# Patient Record
Sex: MALE | Race: WHITE | NOT HISPANIC OR LATINO
[De-identification: names, ages, dates, MRNs, and addresses within clinical notes are randomized per-mention and may not be internally consistent; named-entity substitution may affect disease eponyms.]

---

## 2020-10-14 ENCOUNTER — APPOINTMENT (OUTPATIENT)
Dept: DERMATOLOGY | Facility: CLINIC | Age: 35
End: 2020-10-14

## 2020-11-11 ENCOUNTER — APPOINTMENT (OUTPATIENT)
Dept: DERMATOLOGY | Facility: CLINIC | Age: 35
End: 2020-11-11
Payer: COMMERCIAL

## 2020-11-11 DIAGNOSIS — D22.39 MELANOCYTIC NEVI OF OTHER PARTS OF FACE: ICD-10-CM

## 2020-11-11 DIAGNOSIS — L81.4 OTHER MELANIN HYPERPIGMENTATION: ICD-10-CM

## 2020-11-11 DIAGNOSIS — D22.5 MELANOCYTIC NEVI OF TRUNK: ICD-10-CM

## 2020-11-11 PROBLEM — Z00.00 ENCOUNTER FOR PREVENTIVE HEALTH EXAMINATION: Status: ACTIVE | Noted: 2020-11-11

## 2020-11-11 PROCEDURE — 99072 ADDL SUPL MATRL&STAF TM PHE: CPT

## 2020-11-11 PROCEDURE — 99203 OFFICE O/P NEW LOW 30 MIN: CPT

## 2020-11-11 NOTE — PHYSICAL EXAM
[Alert] : alert [Oriented x 3] : ~L oriented x 3 [Well Nourished] : well nourished [FreeTextEntry3] : The following areas were examined and no significant abnormalities were seen except as noted below:\par \par Type II skin\par \par scalp, face, eyelids, nose, lips, ears, neck, chest, abdomen, back,groin, buttocks, right arm, left arm, right hand, left hand,\par right  leg, left leg, right foot, left foot\par \par Right lateral cheek: 9 x 5 mm tan smooth patch-not suspicious in dermoscopy\par Nose: Mild 2 mm skin-colored papules, especially near the left corner of the nose\par Trunk: Mild to moderate brown macules and papules\par \par No suspicious lesion seen

## 2020-11-11 NOTE — HISTORY OF PRESENT ILLNESS
[FreeTextEntry1] : Evaluation of growths [de-identified] : First visit for 34 year old white male presenting for evaluation of growths.  Particularly concerned about lesions on the back. No history of skin cancer.

## 2020-11-30 ENCOUNTER — APPOINTMENT (OUTPATIENT)
Dept: DERMATOLOGY | Facility: CLINIC | Age: 35
End: 2020-11-30
Payer: COMMERCIAL

## 2020-11-30 VITALS — BODY MASS INDEX: 21.76 KG/M2 | WEIGHT: 175 LBS | HEIGHT: 75 IN

## 2020-11-30 DIAGNOSIS — D18.01 HEMANGIOMA OF SKIN AND SUBCUTANEOUS TISSUE: ICD-10-CM

## 2020-11-30 DIAGNOSIS — L70.0 ACNE VULGARIS: ICD-10-CM

## 2020-11-30 DIAGNOSIS — L81.4 OTHER MELANIN HYPERPIGMENTATION: ICD-10-CM

## 2020-11-30 DIAGNOSIS — D22.9 MELANOCYTIC NEVI, UNSPECIFIED: ICD-10-CM

## 2020-11-30 DIAGNOSIS — I78.1 NEVUS, NON-NEOPLASTIC: ICD-10-CM

## 2020-11-30 PROCEDURE — 99213 OFFICE O/P EST LOW 20 MIN: CPT

## 2020-11-30 RX ORDER — CLINDAMYCIN AND BENZOYL PEROXIDE 50; 10 MG/G; MG/G
1-5 GEL TOPICAL
Qty: 1 | Refills: 2 | Status: ACTIVE | COMMUNITY
Start: 2020-11-30 | End: 1900-01-01

## 2020-11-30 NOTE — HISTORY OF PRESENT ILLNESS
[FreeTextEntry1] : Patient presents for skin examination. [de-identified] : Notes breakouts on the face, and red spots of the face.  Denies bleeding or irritated lesions.  No self tx. for facial breakouts.

## 2020-11-30 NOTE — ASSESSMENT
[FreeTextEntry1] : A complete skin examination was performed.  There is no evidence of skin cancer.  We discussed the importance of photoprotection, including the use of hats, protective clothing and sunscreens with an SPF of at least 30.  Sun avoidance was also discussed.  The ABCDE's of melanoma was discussed.  Regular skin exams recommended.\par \par Acne\par tretinoin 0.025% cream qhs\par benzaclin gel qd\par Glyderm mask weekly.\par \par Telangiectasias - face.\par Education.\par PDL - risks/benefits discussed.\par Cosmetic at [facility]/tx for 1 or more txs as desired.\par \par Discussed other skin concerns such as elasticity of the skin.  \par \par

## 2020-11-30 NOTE — PHYSICAL EXAM
[Alert] : alert [Oriented x 3] : ~L oriented x 3 [Well Nourished] : well nourished [Full Body Skin Exam Performed] : performed [FreeTextEntry3] : A full skin exam was performed including the scalp, face (including lips, ears, nose and eyes), neck, chest, abdomen, back, buttocks, upper extremities and lower extremities.  The patient declined examination of the genitalia.  \par The exam revealed the following benign growths:\par Nicollet pigmented nevi - mostly on the back.  homogeneous and regular.\par Lentigines - extensive on the upper back and shoulders.\par Cherry angioma - abdomen.\par Telangiectasias and erythema - bilateral malar region.\par \par Some acne scarring of the bilateral malar region.

## 2020-12-04 RX ORDER — TRETINOIN 0.25 MG/G
0.03 CREAM TOPICAL
Qty: 1 | Refills: 2 | Status: ACTIVE | COMMUNITY
Start: 2020-11-30

## 2021-01-05 ENCOUNTER — APPOINTMENT (OUTPATIENT)
Dept: DERMATOLOGY | Facility: CLINIC | Age: 36
End: 2021-01-05
Payer: SELF-PAY

## 2021-01-05 DIAGNOSIS — Z41.1 ENCOUNTER FOR COSMETIC SURGERY: ICD-10-CM

## 2021-01-05 PROCEDURE — D0097: CPT

## 2022-07-27 ENCOUNTER — APPOINTMENT (OUTPATIENT)
Dept: ORTHOPEDIC SURGERY | Facility: CLINIC | Age: 37
End: 2022-07-27

## 2022-07-27 PROCEDURE — 99204 OFFICE O/P NEW MOD 45 MIN: CPT

## 2022-07-27 PROCEDURE — 97760 ORTHOTIC MGMT&TRAING 1ST ENC: CPT

## 2022-07-27 RX ORDER — HYDROCODONE BITARTRATE AND ACETAMINOPHEN 5; 325 MG/1; MG/1
5-325 TABLET ORAL
Qty: 40 | Refills: 0 | Status: ACTIVE | COMMUNITY
Start: 2022-07-27 | End: 1900-01-01

## 2022-07-27 RX ORDER — ASPIRIN 325 MG/1
325 TABLET, FILM COATED ORAL DAILY
Qty: 30 | Refills: 1 | Status: ACTIVE | COMMUNITY
Start: 2022-07-27 | End: 1900-01-01

## 2022-07-27 NOTE — DISCUSSION/SUMMARY
[de-identified] : Today I had a lengthy discussion with the patient regarding their right Achilles rupture. I have addressed all the patient's concerns surrounding the pathology of their condition. XR imaging and US results were reviewed with the patient. We discussed both operative and non-operative treatment options. At this time I would like to obtain advanced imaging of the patient's right ankle. An MRI was ordered so I can find out more about the etiology of the patient's condition. The patient should follow up with the office after obtaining the MRI.\par \par In the interim, I recommended that the patient utilize a CAM boot with lifts. The patient was fitted for the CAM boot and lifts in the office today. The patient was educated about the boot wear pattern and utilization, as well as the timeframe to come out of the boot. He was also given full instructions for using the boot. He should continue to utilize the crutches for ambulation. He should remain nonweightbearing on the RLE. I advised the patient to utilize 325 mg of Aspirin as instructed for blood thinning purposes. The prescription for the Aspirin was provided for the patient in the office today. Pain medications were also prescribed. The patient understood and verbally agreed to the treatment plan. All of their questions were answered and they were satisfied with the visit. The patient should call the office if they have any questions or experience worsening symptoms.

## 2022-07-27 NOTE — HISTORY OF PRESENT ILLNESS
[FreeTextEntry1] : The patient is a 36 year old male presenting for an initial evaluation of a right ankle injury sustained on 7/19/2022. The patient reports an onset of pain and a pop to his right Achilles while jogging in a field. He states that he has obtained x-rays and US at  which showed concern in regards to a possible Achilles rupture. He presents today for further evaluation of the same. He does report pain and swelling localized to his Achilles, worsened with walking, lifting, and weight bearing. The patient presents ambulating with crutches. 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 Pain scale 7 out of 10. He has no numbness or tingling in the ankle or foot. He denies calf pain. Previous lateral wound. He denies any utilization of antibiotics due to the wound. He is taking oral antiinflammatories for pain. No other complaints.\par

## 2022-07-27 NOTE — PHYSICAL EXAM
[de-identified] : General: Alert and oriented x3. In no acute distress. Pleasant in nature with a normal affect. No apparent respiratory distress.\par \par Right Ankle Exam\par Skin: Clean, dry, intact\par Inspection: No obvious malalignment, + swelling, no effusion; no lymphadenopathy. Middle third defect at the Achilles with a positive Hines's Test. \par Pulses: 2+ DP/PT pulses\par ROM: 10 degrees of dorsiflexion, 40 degrees of plantarflexion, 10 degrees of subtalar motion\par Tenderness: Pain at the Achilles. No tenderness over the lateral malleolus, no CFL/ATFL/PTFL pain. No medial malleolus pain, no deltoid ligament pain. No proximal fibular pain. No heel pain.\par Stability: Negative anterior/posterior drawer.\par Strength: 5/5 TA/GS/EHL\par Neuro: In tact to light touch throughout\par Additional tests: Negative Mortons test, Negative syndesmosis squeeze test. [de-identified] : Office Location: [address] [place] [address], [address]\par Office Phone: [phone]\par Office Fax: [phone]\par PATIENT NAME: Marco Fraser\par PATIENT PHONE NUMBER:\par PATIENT ID: [id number]\par : 1985\par DATE OF EXAM: 2022\par R. Phys. Name: Harmeet Thompson\par R. Phys. Address: [address] [place] [address] [place], [facility]\par R. Phys. Phone: [phone]\par History: Possible Achilles tendon tear\par \par Technique: Right Achilles tendon imaged using a linear 12 MHz transducer\par \par Comparison: No prior studies are available for direct comparison at the time of\par this interpretation.\par \par Findings: There is severe tendinosis of the Achilles tendon with a high-grade\par partial versus complete tear of its proximal aspect. Torn fibers are retracted\par displaced by approximately 2 mm. Associated hyperemia is present.\par \par There is mild adjacent soft tissue edema.\par \par IMPRESSION:\par \par \par High-grade partial versus complete tear of the proximal aspect of the Achilles\par tendon with retraction of the torn fibers by approximately 2 mm.\par \par Clinical correlation is recommended and if the patient's symptoms persist, then\par further evaluation with noncontrast MRI (if there are no contraindications) may\par provide additional diagnostic information.\par \par Signed by: Wilfrid Wilson MD\par Signed Date: 2022 2:38 PM EDT\par \par SIGNED BY: Wilfrid Wilson M.D., Ext. [phone] 02:38 PM\par \par Xrays of right ankle obtained from  Radiology on 2022 and reviewed in the office today, 2022 , revealed: Thickening of the Achilles tendon with underlying tendinopathy and partial tear. \par

## 2022-07-27 NOTE — ADDENDUM
[FreeTextEntry1] : I, Estela Nation, acted solely as a scribe for Dr. Ten Ko on this date 07/27/2022.\par \par All medical record entries made by the Scribe were at my, Dr. Ten Ko, direction and personally dictated by me on 07/27/2022. I have reviewed the chart and agree that the record accurately reflects my personal performance of the history, physical exam, assessment and plan. I have also personally directed, reviewed, and agreed with the chart.	\par

## 2022-07-28 ENCOUNTER — APPOINTMENT (OUTPATIENT)
Dept: MRI IMAGING | Facility: CLINIC | Age: 37
End: 2022-07-28

## 2022-07-28 ENCOUNTER — OUTPATIENT (OUTPATIENT)
Dept: OUTPATIENT SERVICES | Facility: HOSPITAL | Age: 37
LOS: 1 days | End: 2022-07-28
Payer: SELF-PAY

## 2022-07-28 DIAGNOSIS — S86.011A STRAIN OF RIGHT ACHILLES TENDON, INITIAL ENCOUNTER: ICD-10-CM

## 2022-07-28 PROCEDURE — 73721 MRI JNT OF LWR EXTRE W/O DYE: CPT

## 2022-07-28 PROCEDURE — 73721 MRI JNT OF LWR EXTRE W/O DYE: CPT | Mod: 26,RT

## 2022-08-01 ENCOUNTER — APPOINTMENT (OUTPATIENT)
Dept: ORTHOPEDIC SURGERY | Facility: CLINIC | Age: 37
End: 2022-08-01

## 2022-08-01 VITALS
BODY MASS INDEX: 23.13 KG/M2 | HEIGHT: 75 IN | SYSTOLIC BLOOD PRESSURE: 117 MMHG | DIASTOLIC BLOOD PRESSURE: 71 MMHG | WEIGHT: 186 LBS | HEART RATE: 60 BPM

## 2022-08-01 PROCEDURE — 99214 OFFICE O/P EST MOD 30 MIN: CPT

## 2022-08-01 NOTE — HISTORY OF PRESENT ILLNESS
[FreeTextEntry1] : 8/1/22: The patient is here to review the MRI of his right ankle.  Patient using boot and crutches.  Patient in DVT prophylaxis with aspirin.  Pain controlled.  No other complaints. \par \par 7/27/22: The patient is a 36 year old male presenting for an initial evaluation of a right ankle injury sustained on 7/19/2022. The patient reports an onset of pain and a pop to his right Achilles while jogging in a field. He states that he has obtained x-rays and US at  which showed concern in regards to a possible Achilles rupture. He presents today for further evaluation of the same. He does report pain and swelling localized to his Achilles, worsened with walking, lifting, and weight bearing. The patient presents ambulating with crutches. 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 Pain scale 7 out of 10. He has no numbness or tingling in the ankle or foot. He denies calf pain. Previous lateral wound. He denies any utilization of antibiotics due to the wound. He is taking oral antiinflammatories for pain. No other complaints.\par

## 2022-08-01 NOTE — PHYSICAL EXAM
[de-identified] : General: Alert and oriented x3. In no acute distress. Pleasant in nature with a normal affect. No apparent respiratory distress.\par \par Right Ankle Exam\par Skin: Clean, dry, intact\par Inspection: No obvious malalignment, + swelling, no effusion; no lymphadenopathy. Middle third defect at the Achilles with a positive Hines's Test. \par Pulses: 2+ DP/PT pulses\par ROM: 10 degrees of dorsiflexion, 40 degrees of plantarflexion, 10 degrees of subtalar motion\par Tenderness: Pain at the Achilles. No tenderness over the lateral malleolus, no CFL/ATFL/PTFL pain. No medial malleolus pain, no deltoid ligament pain. No proximal fibular pain. No heel pain.\par Stability: Negative anterior/posterior drawer.\par Strength: 5/5 TA/GS/EHL\par Neuro: In tact to light touch throughout\par Additional tests: Negative Mortons test, Negative syndesmosis squeeze test. [de-identified] : EXAM: [id number] - MR ANKLE RT - ORDERED BY: SHANICE CARMICHAEL\par \par \par PROCEDURE DATE: 07/28/2022\par \par \par \par INTERPRETATION: EXAMINATION: MRI of the right ankle without contrast\par \par CLINICAL INFORMATION: Right ankle pain. Evaluate for Achilles tendon tear.\par \par TECHNIQUE: Multiplanar, multisequential MR imaging was performed.\par \par FINDINGS: There is a full-thickness tear of the Achilles tendon. The tear occurs approximately 6.8 cm proximal to its insertion on the calcaneus. There is a 0.9 cm fluid-filled tear gap between the proximal and distal stumps the tendon. The proximal and distal stumps of the tendon or thickened and degenerated.\par \par There is no fracture or osteonecrosis. There are no joint effusions. Articular cartilage at the imaged joints is preserved.\par \par The medial and lateral ankle ligaments are intact. The syndesmotic ligaments are intact.\par \par There are no acute tendon or muscle tears. There is no muscle atrophy. There is diffuse subcutaneous soft tissue edema.\par \par IMPRESSION: Full-thickness tear of the Achilles tendon, as described above.\par \par --- End of Report ---\par \par \par \par \par \par \par CYRIL GILMORE MD; Attending Radiologist\par This document has been electronically signed. Jul 28 2022 3:22PM\par \par

## 2022-08-01 NOTE — DISCUSSION/SUMMARY
[de-identified] : At this time I reviewed the MRI of the right ankle in great details with the patient and answered all his questions about the right Achilles tendon rupture.  We went over surgical versus nonsurgical discussion about the Achilles tendon rupture.  At this time the patient would like to go ahead with the surgical procedure.  The consent will read as follows, " right Achilles tendon repair."  Prior to the surgery the patient will need medical clearance which includes lab work and a COVID-19 PCR test.  The patient will speak with my surgical coordinator to get on the surgical schedule in the near future.  In the interim the patient will continue with the cam walking boot, heel lifts and crutches.  He will continue with aspirin daily for DVT prophylaxis.  He will continue with ice and elevation for swelling control.  He could use over-the-counter anti-inflammatories and Tylenol for pain.\par \par All risks, benefits and alternatives to the recommended surgical procedure were discussed which include but are not limited to bleeding, infection, nerve damage, vascular damage, failure of the wound to heal, the need for further surgery, loss of limb, DVT, PE, loss of life as well as the risks associated with general anesthesia. The patient verbalized understanding and provided informed consent to move forward with surgery.

## 2022-08-04 DIAGNOSIS — Z01.818 ENCOUNTER FOR OTHER PREPROCEDURAL EXAMINATION: ICD-10-CM

## 2022-08-08 LAB — SARS-COV-2 N GENE NPH QL NAA+PROBE: NOT DETECTED

## 2022-08-08 NOTE — ASU PATIENT PROFILE, ADULT - NSICDXPASTMEDICALHX_GEN_ALL_CORE_FT
PAST MEDICAL HISTORY:  Achilles rupture     Cherry angioma     Fibrous papule of nose     H/O acne vulgaris     Lentigines     Multiple benign melanocytic nevi     Telangiectasia

## 2022-08-08 NOTE — ASU PATIENT PROFILE, ADULT - FALL HARM RISK - UNIVERSAL INTERVENTIONS
Bed in lowest position, wheels locked, appropriate side rails in place/Call bell, personal items and telephone in reach/Instruct patient to call for assistance before getting out of bed or chair/Non-slip footwear when patient is out of bed/[place] to call system/Physically safe environment - no spills, clutter or unnecessary equipment/Purposeful Proactive Rounding/Room/bathroom lighting operational, light cord in reach

## 2022-08-08 NOTE — ASU PATIENT PROFILE, ADULT - BILL OF RIGHTS/ADMISSION INFORMATION PROVIDED TO:
[FreeTextEntry1] : Pulmonary function test is performed. Lung volumes reveal a mild decrease in the total lung capacity and vital capacity. The residual volume and FRC are moderately reduced. Lung mechanics are within normal limits. Slight bronchodilator activity is demonstrated. The DLCO and saturation maintained. This represents a mild to moderate degree of restriction. Slight airway reactivity is demonstrated.
Patient

## 2022-08-08 NOTE — ASU PATIENT PROFILE, ADULT - HAVE YOU HAD A SECOND COVID-19 BOOSTER?
ROS: Constitutional- -fever, -chills.  Respiratory- -cough, -SOB  Cardiac- no chest pain, no palpitations, ENT- -rhinorrhea, no sore throat, no congestion.  Abdomen- No nausea, no vomiting, no diarrhea.  Urinary- no dysuria, no urgency, no frequency.  Skin- No rashes
Yes

## 2022-08-09 ENCOUNTER — APPOINTMENT (OUTPATIENT)
Dept: ORTHOPEDIC SURGERY | Facility: HOSPITAL | Age: 37
End: 2022-08-09

## 2022-08-09 ENCOUNTER — TRANSCRIPTION ENCOUNTER (OUTPATIENT)
Age: 37
End: 2022-08-09

## 2022-08-09 ENCOUNTER — OUTPATIENT (OUTPATIENT)
Dept: INPATIENT UNIT | Facility: HOSPITAL | Age: 37
LOS: 1 days | Discharge: HOME | End: 2022-08-09
Payer: SELF-PAY

## 2022-08-09 VITALS
SYSTOLIC BLOOD PRESSURE: 134 MMHG | RESPIRATION RATE: 17 BRPM | TEMPERATURE: 98 F | OXYGEN SATURATION: 100 % | HEART RATE: 40 BPM | DIASTOLIC BLOOD PRESSURE: 81 MMHG

## 2022-08-09 VITALS
HEART RATE: 48 BPM | RESPIRATION RATE: 16 BRPM | SYSTOLIC BLOOD PRESSURE: 122 MMHG | HEIGHT: 75 IN | WEIGHT: 186.07 LBS | DIASTOLIC BLOOD PRESSURE: 72 MMHG | OXYGEN SATURATION: 96 % | TEMPERATURE: 97 F

## 2022-08-09 DIAGNOSIS — S86.011A STRAIN OF RIGHT ACHILLES TENDON, INITIAL ENCOUNTER: ICD-10-CM

## 2022-08-09 PROCEDURE — 27650 REPAIR ACHILLES TENDON: CPT | Mod: RT

## 2022-08-09 RX ORDER — ONDANSETRON 8 MG/1
4 TABLET, FILM COATED ORAL ONCE
Refills: 0 | Status: DISCONTINUED | OUTPATIENT
Start: 2022-08-09 | End: 2022-08-09

## 2022-08-09 RX ORDER — FENTANYL CITRATE 50 UG/ML
50 INJECTION INTRAVENOUS
Refills: 0 | Status: DISCONTINUED | OUTPATIENT
Start: 2022-08-09 | End: 2022-08-09

## 2022-08-09 RX ORDER — DOCUSATE SODIUM 100 MG
1 CAPSULE ORAL
Qty: 30 | Refills: 0
Start: 2022-08-09 | End: 2022-08-18

## 2022-08-09 RX ORDER — ONDANSETRON 8 MG/1
1 TABLET, FILM COATED ORAL
Qty: 40 | Refills: 0
Start: 2022-08-09 | End: 2022-08-18

## 2022-08-09 RX ORDER — CLINDAMYCIN PHOSPHATE GEL USP, 1% 10 MG/G
1 GEL TOPICAL
Qty: 0 | Refills: 0 | DISCHARGE

## 2022-08-09 RX ORDER — OXYCODONE HYDROCHLORIDE 5 MG/1
5 TABLET ORAL ONCE
Refills: 0 | Status: DISCONTINUED | OUTPATIENT
Start: 2022-08-09 | End: 2022-08-09

## 2022-08-09 RX ORDER — ASPIRIN/CALCIUM CARB/MAGNESIUM 324 MG
1 TABLET ORAL
Qty: 30 | Refills: 0
Start: 2022-08-09 | End: 2022-09-07

## 2022-08-09 RX ORDER — SODIUM CHLORIDE 9 MG/ML
1000 INJECTION, SOLUTION INTRAVENOUS
Refills: 0 | Status: DISCONTINUED | OUTPATIENT
Start: 2022-08-09 | End: 2022-08-09

## 2022-08-09 RX ORDER — ASPIRIN/CALCIUM CARB/MAGNESIUM 324 MG
1 TABLET ORAL
Qty: 0 | Refills: 0 | DISCHARGE

## 2022-08-09 RX ORDER — OXYCODONE HYDROCHLORIDE 5 MG/1
1 TABLET ORAL
Qty: 24 | Refills: 0
Start: 2022-08-09 | End: 2022-08-12

## 2022-08-09 RX ORDER — HYDROCODONE BITARTRATE AND ACETAMINOPHEN 7.5; 325 MG/15ML; MG/15ML
1 SOLUTION ORAL
Qty: 0 | Refills: 0 | DISCHARGE

## 2022-08-09 RX ADMIN — OXYCODONE HYDROCHLORIDE 5 MILLIGRAM(S): 5 TABLET ORAL at 16:28

## 2022-08-09 RX ADMIN — FENTANYL CITRATE 50 MICROGRAM(S): 50 INJECTION INTRAVENOUS at 16:28

## 2022-08-09 RX ADMIN — SODIUM CHLORIDE 125 MILLILITER(S): 9 INJECTION, SOLUTION INTRAVENOUS at 16:28

## 2022-08-09 RX ADMIN — FENTANYL CITRATE 50 MICROGRAM(S): 50 INJECTION INTRAVENOUS at 16:14

## 2022-08-09 NOTE — ASU DISCHARGE PLAN (ADULT/PEDIATRIC) - CARE PROVIDER_API CALL
Ten Ko (DO)  Orthopaedic Surgery  [address] [place] [address]  Phone: [phone]  Fax: [phone]  Follow Up Time:

## 2022-08-09 NOTE — ASU DISCHARGE PLAN (ADULT/PEDIATRIC) - NS MD DC FALL RISK RISK
For information on Fall & Injury Prevention, visit: [email or web address] OR  [email or web address] OR  https://www.cdc.gov/steadi/patient.html

## 2022-08-09 NOTE — ASU DISCHARGE PLAN (ADULT/PEDIATRIC) - ASU DC SPECIAL INSTRUCTIONSFT
Post-Operative Instructions    PRESCRIPTIONS: your post-operative medications have been handed to you or sent to the pharmacy you indicated at your pre-operative visit.  If you have any difficulty obtaining your post-operative medications or have any questions, please call the office at [phone].      PAIN MANAGEMENT: You should expect to have discomfort for the first week or so after surgery. Pain medication should be taken to help alleviate the pain so that you are comfortable and can participate in physical therapy.  Take the medication as directed.  You may decrease the amount of pain medication, as tolerated, when pain improves.  You must exercise caution when operating a motor vehicle. You have been prescribed one or more of the following as indicated on your Med Rec form that the Nurse will go over with you:  [ x ] Oxycodone 5mg 1-2 tablets by mouth every 4 to 6 hours as needed for pain  Oxycodone is a short-acting pain medication routinely prescribed after surgery.  It is the pain medication found in “Percocet,” which is a combination of oxycodone and Tylenol.  If you were prescribed oxycodone, you may take Tylenol in addition to this medication, if needed for pain control.  [  ] Oxycontin 10mg by mouth every 12 hours for 5 days  Oxycontin is a long-acting pain medication.  It is sometimes prescribed after longer surgeries. If you were prescribed this medication, you should take it twice a day for the first 5 days after surgery to help with pain control.  [  ] Vicodin or Norco (Hydrocodone 5mg/Tylenol 325mg) 1-2 tablets by mouth every 4-6 hours as needed for pain  Vicodin and Norco are short acting pain medications sometimes prescribed after surgery.  If you were prescribed this medication, you may take 1-2 tablets every 4-6 hours as needed for pain.  This medication already contains Tylenol.  You should NOT take any additional Tylenol (acetaminophen) if you are taking these medications.    NAUSEA: Nausea is a common side effect of anesthesia and pain medications.  You may take the below medication if you are experiencing nausea after surgery.  If you continue to experience nausea or vomiting more than 24 hours after surgery, please call the office.  [ x ] Ondansetron 4mg by mouth every 6 hours as needed for nausea    CONSTIPATION: common side effect of anesthesia and pain medications.  You should take Colace three times daily, as long as you are taking narcotic pain medications after surgery, such as oxycodone, oxycontin, vicodin, or norco.  [ x ] Colace 100mg by mouth three times daily    DVT PROPHYLAXIS (PREVENTION OF BLOOD CLOTS): Aspirin EC can reduce the risk of blood clots after surgery, particularly after surgery on the legs, ankles and feet.  If you have been prescribed Aspirin, it is essential that you take this medication.  If you already are on an anticoagulant (blood thinner) such as Xarelto, Coumadin, Warfarin, Eliquis - you should resume you home "blood-thinner" in place of the Aspirin.  [ x ] Aspirin 325mg ENTERIC COATED (EC) by mouth once daily for 2-4 weeks (depending on surgical procedure)    ACTIVITY: You should be up and moving as much and as often as possible! You must not bear weight on your splint. Use Crutches or walker as needed. You must keep your bandage/splint dry. Do NOT get it wet. IF you shower it MUST be covered tightly with a garbage bag. Otherwise sponge bath is advised. You do NOT want wet bandages on your skin as they can cause skin breakdown under the splint.    BANDAGE: Your bandage will be changed in the office. Do NOT remove it yourself. If it gets damaged or wet (soaked) call. Follow up in about 14 days in office or as otherwise advised by Dr. Ko.

## 2022-08-11 DIAGNOSIS — S86.011A STRAIN OF RIGHT ACHILLES TENDON, INITIAL ENCOUNTER: ICD-10-CM
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
## 2022-08-24 ENCOUNTER — APPOINTMENT (OUTPATIENT)
Dept: ORTHOPEDIC SURGERY | Facility: CLINIC | Age: 37
End: 2022-08-24

## 2022-08-24 VITALS
HEIGHT: 75 IN | WEIGHT: 186 LBS | HEART RATE: 78 BPM | DIASTOLIC BLOOD PRESSURE: 70 MMHG | BODY MASS INDEX: 23.13 KG/M2 | SYSTOLIC BLOOD PRESSURE: 120 MMHG

## 2022-08-24 PROBLEM — S86.019A STRAIN OF UNSPECIFIED ACHILLES TENDON, INITIAL ENCOUNTER: Chronic | Status: ACTIVE | Noted: 2022-08-08

## 2022-08-24 PROBLEM — I78.1 NEVUS, NON-NEOPLASTIC: Chronic | Status: ACTIVE | Noted: 2022-08-08

## 2022-08-24 PROBLEM — Z87.2 PERSONAL HISTORY OF DISEASES OF THE SKIN AND SUBCUTANEOUS TISSUE: Chronic | Status: ACTIVE | Noted: 2022-08-08

## 2022-08-24 PROBLEM — D22.39 MELANOCYTIC NEVI OF OTHER PARTS OF FACE: Chronic | Status: ACTIVE | Noted: 2022-08-08

## 2022-08-24 PROBLEM — L81.4 OTHER MELANIN HYPERPIGMENTATION: Chronic | Status: ACTIVE | Noted: 2022-08-08

## 2022-08-24 PROBLEM — D18.01 HEMANGIOMA OF SKIN AND SUBCUTANEOUS TISSUE: Chronic | Status: ACTIVE | Noted: 2022-08-08

## 2022-08-24 PROBLEM — D22.9 MELANOCYTIC NEVI, UNSPECIFIED: Chronic | Status: ACTIVE | Noted: 2022-08-08

## 2022-08-24 PROCEDURE — 99024 POSTOP FOLLOW-UP VISIT: CPT

## 2022-08-24 RX ORDER — DOCUSATE SODIUM 100 MG/1
100 CAPSULE ORAL TWICE DAILY
Qty: 30 | Refills: 0 | Status: ACTIVE | COMMUNITY
Start: 2022-08-24 | End: 1900-01-01

## 2022-08-24 RX ORDER — OXYCODONE 5 MG/1
5 TABLET ORAL
Qty: 28 | Refills: 0 | Status: ACTIVE | COMMUNITY
Start: 2022-08-24 | End: 1900-01-01

## 2022-08-24 NOTE — HISTORY OF PRESENT ILLNESS
[___ Weeks Post Op] : [unfilled] weeks post op [Doing Well] : is doing well [No Sign of Infection] : is showing no signs of infection [Adequate Pain Control] : has adequate pain control [Sutures Removed] : sutures were removed [Steri-Strips Removed & Replaced] : steri-strips removed and replaced [Chills] : no chills [Fever] : no fever [Nausea] : no nausea [Vomiting] : no vomiting [de-identified] : Status post right Achilles tendon repair 8/9/2022 [de-identified] : 36 year old  male presenting in the office over 2 weeks post op from right Achilles tendon repair. The patient's pain is noted to be localized to the surgical site. He is taking ASA once daily for DVT Prophylaxis. He is taking pain medications PRN. He presents wearing a posterior splint and ambulating with crutches. The patient is accompanied by their mother. No other complaints at this time.	 [de-identified] : Physical exam of the right ankle:\par \par No erythema, warmth, rubor.  Minimal swelling present.  Ankle range of motion and special testing were not performed today status post Achilles tendon repair.  The nylon sutures were removed.  There was no wound dehiscence, drainage, signs of infection.  The incision was reinforced with Steri-Strips post suture removal and a clean, dry sterile dressing was placed.  Dorsalis pedis pulses normal.  Capillary refill in the digits is less than 2 seconds.  Neurovascularly intact. [de-identified] : No images performed. [de-identified] : Status post right Achilles tendon repair, 8/9/2022. [de-identified] : Patient is a 36 year old male presenting in the office today over 2 weeks status post right Achilles tendon repair. \par \par At this time, I recommended that the patient utilize a CAM boot. The patient was fitted for a posterior splint in the office today as he did not have the CAM boot present. He will transition to the CAM boot once he is at home. The patient was educated about the boot wear pattern and utilization, as well as the timeframe to come out of the boot. He was also given full instructions for using the boot. He should remain nonweightbearing until further evaluation. I advised the patient to utilize 325 mg of Aspirin as instructed for blood thinning purposes. I recommend that the patient utilize ice and also elevate their right ankle above the level of the heart. Pain medications were renewed in the office today. 	\par \par I have addressed all the patient's concerns surrounding the pathology of their condition. The patient understood and verbally agreed to the treatment plan. All of their questions were answered and they were satisfied with the visit. The patient should call the office if they have any questions or experience worsening symptoms.\par \par Follow up in 2 weeks for re-evaluation. \par \par Patient is 100% temporarily disabled and will be out of work until further notice.

## 2022-08-24 NOTE — PROCEDURE
[de-identified] : A short leg posterior mold with sugartong splint was applied to the right leg. Appropriate padding was applied to ensure all socorro prominences were protected. Splint instructions explained. All questions were answered. The patient has expressed acceptance and understanding.

## 2022-09-08 ENCOUNTER — APPOINTMENT (OUTPATIENT)
Dept: ORTHOPEDIC SURGERY | Facility: CLINIC | Age: 37
End: 2022-09-08

## 2022-09-08 DIAGNOSIS — S86.011A STRAIN OF RIGHT ACHILLES TENDON, INITIAL ENCOUNTER: ICD-10-CM

## 2022-09-08 PROCEDURE — 99024 POSTOP FOLLOW-UP VISIT: CPT

## 2022-09-08 NOTE — ADDENDUM
[FreeTextEntry1] : I, Estela Nation, acted solely as a scribe for Dr. Ten Ko on this date 09/08/2022.\par \par All medical record entries made by the Scribe were at my, Dr. Ten Ko, direction and personally dictated by me on 09/08/2022. I have reviewed the chart and agree that the record accurately reflects my personal performance of the history, physical exam, assessment and plan. I have also personally directed, reviewed, and agreed with the chart.	\par

## 2022-09-08 NOTE — HISTORY OF PRESENT ILLNESS
[___ Weeks Post Op] : [unfilled] weeks post op [Neuro Intact] : an unremarkable neurological exam [Vascular Intact] : ~T peripheral vascular exam normal [Negative Dileep's] : maneuvers demonstrated a negative Dileep's sign [Doing Well] : is doing well [No Sign of Infection] : is showing no signs of infection [Adequate Pain Control] : has adequate pain control [Sutures Removed] : sutures were removed [Steri-Strips Removed & Replaced] : steri-strips removed and replaced [Chills] : no chills [Fever] : no fever [Nausea] : no nausea [Vomiting] : no vomiting [de-identified] : Status post right Achilles tendon repair 8/9/2022 [de-identified] : 36 year old  male presenting in the office over 4 weeks post op from right Achilles tendon repair. The patient's pain is noted to be localized to the surgical site, with improvement stated since prior evaluation. He is taking Aspirin for DVT Prophylaxis. He is taking pain medications occasionally for pain. He presents wearing a CAM boot and is ambulating with crutches. The patient is accompanied by their mother. No other complaints at this time.	\par \par Of note, the patient is reporting increased pain in his left Achilles. History of L sided Achilles repair.  [de-identified] : Physical exam of the right ankle:\par \par No erythema, warmth, rubor.  Minimal swelling present.  The nylon sutures were previously removed.  There was no wound dehiscence, drainage, signs of infection.  The incision was reinforced with Steri-Strips post suture removal and a clean, dry sterile dressing was placed.  Dorsalis pedis pulses normal.  Capillary refill in the digits is less than 2 seconds.  Neurovascularly intact. [de-identified] : No images performed. [de-identified] : Status post right Achilles tendon repair, 8/9/2022. [de-identified] : Patient is a 36 year old male presenting in the office today over 4 weeks status post right Achilles tendon repair. \par \par At this time, I recommend the patient undergo a course of physical therapy for the right Achilles  2-3 times a week for a total of 8-12 weeks. A prescription was given for the physical therapy today. Follow PT protocol. I recommend that the patient utilize ice, NSAIDs, and heat PRN. They can also elevate their RLE above the level of the heart. Continue Aspirin as instructed for DVT Prophylaxis until the patient fully transitions out of the CAM boot. \par \par I have addressed all the patient's concerns surrounding the pathology of their condition. The patient understood and verbally agreed to the treatment plan. All of their questions were answered and they were satisfied with the visit. The patient should call the office if they have any questions or experience worsening symptoms.\par \par Follow up in 2 months for reassessment.

## 2022-09-09 RX ORDER — OXYCODONE 5 MG/1
5 TABLET ORAL
Qty: 28 | Refills: 0 | Status: ACTIVE | COMMUNITY
Start: 2022-08-15 | End: 1900-01-01

## 2022-09-16 RX ORDER — DOCUSATE SODIUM 100 MG/1
100 CAPSULE ORAL TWICE DAILY
Qty: 30 | Refills: 0 | Status: ACTIVE | COMMUNITY
Start: 2022-09-16 | End: 1900-01-01

## 2022-09-16 RX ORDER — OXYCODONE 5 MG/1
5 TABLET ORAL
Qty: 30 | Refills: 0 | Status: ACTIVE | COMMUNITY
Start: 2022-09-16 | End: 1900-01-01

## 2022-09-16 RX ORDER — ASPIRIN 325 MG/1
325 TABLET, FILM COATED ORAL DAILY
Qty: 30 | Refills: 0 | Status: ACTIVE | COMMUNITY
Start: 2022-09-16 | End: 1900-01-01

## 2022-11-07 ENCOUNTER — APPOINTMENT (OUTPATIENT)
Dept: ORTHOPEDIC SURGERY | Facility: CLINIC | Age: 37
End: 2022-11-07

## 2022-11-10 ENCOUNTER — RX RENEWAL (OUTPATIENT)
Age: 37
End: 2022-11-10

## 2022-11-10 RX ORDER — MELOXICAM 15 MG/1
15 TABLET ORAL DAILY
Qty: 30 | Refills: 0 | Status: ACTIVE | COMMUNITY
Start: 2022-09-30 | End: 1900-01-01

## 2022-12-05 ENCOUNTER — APPOINTMENT (OUTPATIENT)
Dept: ORTHOPEDIC SURGERY | Facility: CLINIC | Age: 37
End: 2022-12-05